# Patient Record
Sex: FEMALE | Race: WHITE | NOT HISPANIC OR LATINO | Employment: UNEMPLOYED | URBAN - METROPOLITAN AREA
[De-identification: names, ages, dates, MRNs, and addresses within clinical notes are randomized per-mention and may not be internally consistent; named-entity substitution may affect disease eponyms.]

---

## 2017-08-24 DIAGNOSIS — Z11.59 NEED FOR HEPATITIS C SCREENING TEST: ICD-10-CM

## 2021-03-09 ENCOUNTER — IMMUNIZATION (OUTPATIENT)
Dept: FAMILY MEDICINE | Facility: CLINIC | Age: 61
End: 2021-03-09

## 2021-03-09 DIAGNOSIS — Z23 NEED FOR VACCINATION: Primary | ICD-10-CM

## 2021-03-09 PROCEDURE — 91300 COVID-19, MRNA, LNP-S, PF, 30 MCG/0.3 ML DOSE VACCINE: CPT | Mod: ,,, | Performed by: INTERNAL MEDICINE

## 2021-03-09 PROCEDURE — 0001A COVID-19, MRNA, LNP-S, PF, 30 MCG/0.3 ML DOSE VACCINE: CPT | Mod: CV19,,, | Performed by: INTERNAL MEDICINE

## 2021-03-09 PROCEDURE — 0001A COVID-19, MRNA, LNP-S, PF, 30 MCG/0.3 ML DOSE VACCINE: ICD-10-PCS | Mod: CV19,,, | Performed by: INTERNAL MEDICINE

## 2021-03-09 PROCEDURE — 91300 COVID-19, MRNA, LNP-S, PF, 30 MCG/0.3 ML DOSE VACCINE: ICD-10-PCS | Mod: ,,, | Performed by: INTERNAL MEDICINE

## 2021-03-30 ENCOUNTER — IMMUNIZATION (OUTPATIENT)
Dept: FAMILY MEDICINE | Facility: CLINIC | Age: 61
End: 2021-03-30

## 2021-03-30 DIAGNOSIS — Z23 NEED FOR VACCINATION: Primary | ICD-10-CM

## 2021-03-30 PROCEDURE — 0002A COVID-19, MRNA, LNP-S, PF, 30 MCG/0.3 ML DOSE VACCINE: ICD-10-PCS | Mod: CV19,,, | Performed by: FAMILY MEDICINE

## 2021-03-30 PROCEDURE — 91300 COVID-19, MRNA, LNP-S, PF, 30 MCG/0.3 ML DOSE VACCINE: ICD-10-PCS | Mod: ,,, | Performed by: FAMILY MEDICINE

## 2021-03-30 PROCEDURE — 0002A COVID-19, MRNA, LNP-S, PF, 30 MCG/0.3 ML DOSE VACCINE: CPT | Mod: CV19,,, | Performed by: FAMILY MEDICINE

## 2021-03-30 PROCEDURE — 91300 COVID-19, MRNA, LNP-S, PF, 30 MCG/0.3 ML DOSE VACCINE: CPT | Mod: ,,, | Performed by: FAMILY MEDICINE

## 2021-11-29 ENCOUNTER — IMMUNIZATION (OUTPATIENT)
Dept: PRIMARY CARE CLINIC | Facility: CLINIC | Age: 61
End: 2021-11-29

## 2021-11-29 DIAGNOSIS — Z23 NEED FOR VACCINATION: Primary | ICD-10-CM

## 2021-11-29 PROCEDURE — 91300 COVID-19, MRNA, LNP-S, PF, 30 MCG/0.3 ML DOSE VACCINE: CPT | Mod: S$GLB,,, | Performed by: FAMILY MEDICINE

## 2021-11-29 PROCEDURE — 91300 COVID-19, MRNA, LNP-S, PF, 30 MCG/0.3 ML DOSE VACCINE: ICD-10-PCS | Mod: S$GLB,,, | Performed by: FAMILY MEDICINE

## 2021-11-29 PROCEDURE — 0004A COVID-19, MRNA, LNP-S, PF, 30 MCG/0.3 ML DOSE VACCINE: ICD-10-PCS | Mod: S$GLB,,, | Performed by: FAMILY MEDICINE

## 2021-11-29 PROCEDURE — 0004A COVID-19, MRNA, LNP-S, PF, 30 MCG/0.3 ML DOSE VACCINE: CPT | Mod: S$GLB,,, | Performed by: FAMILY MEDICINE

## 2023-03-10 ENCOUNTER — TELEPHONE (OUTPATIENT)
Dept: ORTHOPEDICS | Facility: CLINIC | Age: 63
End: 2023-03-10

## 2023-03-10 ENCOUNTER — OFFICE VISIT (OUTPATIENT)
Dept: URGENT CARE | Facility: CLINIC | Age: 63
End: 2023-03-10

## 2023-03-10 VITALS
RESPIRATION RATE: 16 BRPM | HEART RATE: 106 BPM | HEIGHT: 67 IN | BODY MASS INDEX: 29.51 KG/M2 | DIASTOLIC BLOOD PRESSURE: 91 MMHG | SYSTOLIC BLOOD PRESSURE: 171 MMHG | OXYGEN SATURATION: 98 % | TEMPERATURE: 98 F | WEIGHT: 188 LBS
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
## 2023-03-10 DIAGNOSIS — S69.91XA HAND INJURY, RIGHT, INITIAL ENCOUNTER: ICD-10-CM

## 2023-03-10 DIAGNOSIS — S62.111A CLOSED CHIP FRACTURE OF TRIQUETRUM OF RIGHT WRIST, INITIAL ENCOUNTER: ICD-10-CM

## 2023-03-10 PROCEDURE — 73110 XR WRIST COMPLETE 3 VIEWS RIGHT: ICD-10-PCS | Mod: RT,S$GLB,, | Performed by: RADIOLOGY

## 2023-03-10 PROCEDURE — 99204 OFFICE O/P NEW MOD 45 MIN: CPT | Mod: TIER,S$GLB,, | Performed by: NURSE PRACTITIONER

## 2023-03-10 PROCEDURE — 73110 X-RAY EXAM OF WRIST: CPT | Mod: RT,S$GLB,, | Performed by: RADIOLOGY

## 2023-03-10 PROCEDURE — 73130 X-RAY EXAM OF HAND: CPT | Mod: RT,S$GLB,, | Performed by: RADIOLOGY

## 2023-03-10 PROCEDURE — 99204 PR OFFICE/OUTPT VISIT, NEW, LEVL IV, 45-59 MIN: ICD-10-PCS | Mod: TIER,S$GLB,, | Performed by: NURSE PRACTITIONER

## 2023-03-10 PROCEDURE — 73130 XR HAND COMPLETE 3 VIEW RIGHT: ICD-10-PCS | Mod: RT,S$GLB,, | Performed by: RADIOLOGY

## 2023-03-10 RX ORDER — HYDROCODONE BITARTRATE AND ACETAMINOPHEN 5; 325 MG/1; MG/1
1 TABLET ORAL EVERY 6 HOURS PRN
Qty: 12 TABLET | Refills: 0 | Status: SHIPPED | OUTPATIENT
Start: 2023-03-10

## 2023-03-10 NOTE — PROGRESS NOTES
"Subjective:       Patient ID: Jenise Aguila is a 62 y.o. female.    Vitals:  height is 5' 7" (1.702 m) and weight is 85.3 kg (188 lb). Her oral temperature is 97.5 °F (36.4 °C). Her blood pressure is 171/91 (abnormal) and her pulse is 106. Her respiration is 16 and oxygen saturation is 98%.     Chief Complaint: Fall    Fall  Incident onset: yesterday. The fall occurred while walking. She landed on [place]. There was no blood loss. The pain is present in the right hand, right lower arm and right wrist. The pain is at a severity of 8/10. The pain is moderate. The symptoms are aggravated by movement and rotation. Associated symptoms include tingling. Pertinent negatives include no numbness. Associated symptoms comments: Swelling. She has tried elevation, acetaminophen and ice for the symptoms. The treatment provided no relief.     Musculoskeletal:  Positive for trauma, joint pain, joint swelling and abnormal ROM of joint.        Right wrist and hand   Skin:  Negative for abrasion, lesion and puncture wound.   Neurological:  Negative for numbness and tingling.     Objective:      Physical Exam   Constitutional: She is oriented to person, place, and time. She appears well-developed.  Non-toxic appearance. She does not appear ill. No distress.   HENT:   Head: Normocephalic and atraumatic.   Nose: Nose normal.   Mouth/Throat: Oropharynx is clear and moist. Mucous membranes are moist.   Eyes: Conjunctivae and EOM are normal.   Cardiovascular: Normal rate.   Pulmonary/Chest: Effort normal and breath sounds normal. No respiratory distress.   Abdominal: Normal appearance.   Musculoskeletal:         General: Swelling, tenderness and signs of injury present. No deformity.      Comments: See attached photos   Neurological: no focal deficit. She is alert and oriented to person, place, and time.   Skin: Skin is warm, dry and no rash. Capillary refill takes 2 to 3 seconds. No lesion   Psychiatric: Her behavior is normal. Mood " normal.   Nursing note and vitals reviewed.                  Assessment:       1. Fall, initial encounter    2. Hand injury, right, initial encounter    3. Closed chip fracture of triquetrum of right wrist, initial encounter        Right wrist xray: FINDINGS:  A 5 mm bone fragment dorsal to the triquetrum is a probable avulsion fracture.  Soft tissue swelling is noted the to the dorsum of the wrist and hand.  The carpals are otherwise intact without subluxation or dislocation.  The radius ulna and metacarpals appear within normal limits.   Impression:   Avulsion fracture of the dorsum of the triquetrum with soft tissue swelling noted.    Right hand xray: FINDINGS:  There is soft tissue swelling of the dorsum of the hand.  A foreign body is not seen.  A fracture of the bones of the right hand is not seen.  There is however bone fragment along the dorsal surface of the proximal row of carpal suggesting a triquetral fracture.  Subluxation of the carpals is not seen.  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   Impression:   Soft tissue swelling of the dorsum of the hand and small bone density dorsal to the proximal row of carpals suggesting a triquetral fracture.  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     Plan:         Fall, initial encounter    Hand injury, right, initial encounter  -     XR HAND COMPLETE 3 VIEW RIGHT; Future; Expected date: 03/10/2023  -     XR WRIST COMPLETE 3 VIEWS RIGHT; Future; Expected date: 03/10/2023    Closed chip fracture of triquetrum of right wrist, initial encounter  -     HME - OTHER - PLASTER SPLINTS  -     HME - OTHER  -     HYDROcodone-acetaminophen (NORCO) 5-325 mg per tablet; Take 1 tablet by mouth every 6 (six) hours as needed for Pain.  Dispense: 12 tablet; Refill: 0  -     Ambulatory referral/consult to Orthopedics         Splint note: There is no neurovascular compromise after splint/immobilizer application.  The splint/immobilizer is  in good alignment and the patient has good sensation and capillary refill at the time of discharge.

## 2023-03-10 NOTE — PATIENT INSTRUCTIONS
Ibuprofen or Aleve over-the-counter as directed for pain.    Norco for breakthrough pain not relieved by ibuprofen or Aleve  Ice to the affected area for 15-20 minutes every 2-3 hours for the 1st 48 hours then just as needed for pain or continued swelling.    Keep the splint in place at all times, do not allowed to get wet.  Sling to be worn when you are up and around to help keep the arm elevated at all times.    Keep right hand elevated as much as possible.    Follow-up with an orthopedic doctor for further management of wrist fracture and need for change of temporary splint to a permanent cast or brace as deemed necessary by the specialist.

## 2023-03-17 ENCOUNTER — OFFICE VISIT (OUTPATIENT)
Dept: ORTHOPEDICS | Facility: CLINIC | Age: 63
End: 2023-03-17

## 2023-03-17 DIAGNOSIS — S62.112A DISPLACED FRACTURE OF TRIQUETRUM (CUNEIFORM) BONE, LEFT WRIST, INITIAL ENCOUNTER FOR CLOSED FRACTURE: Primary | ICD-10-CM

## 2023-03-17 PROCEDURE — 99212 OFFICE O/P EST SF 10 MIN: CPT | Mod: PBBFAC,PN,25 | Performed by: ORTHOPAEDIC SURGERY

## 2023-03-17 PROCEDURE — 25630 CLTX CARPL FX W/O MNPJ EA B1: CPT | Mod: S$PBB,RT,, | Performed by: ORTHOPAEDIC SURGERY

## 2023-03-17 PROCEDURE — 99204 PR OFFICE/OUTPT VISIT, NEW, LEVL IV, 45-59 MIN: ICD-10-PCS | Mod: S$PBB,57,, | Performed by: ORTHOPAEDIC SURGERY

## 2023-03-17 PROCEDURE — 99999 PR PBB SHADOW E&M-EST. PATIENT-LVL II: ICD-10-PCS | Mod: PBBFAC,,, | Performed by: ORTHOPAEDIC SURGERY

## 2023-03-17 PROCEDURE — 99999 PR PBB SHADOW E&M-EST. PATIENT-LVL II: CPT | Mod: PBBFAC,,, | Performed by: ORTHOPAEDIC SURGERY

## 2023-03-17 PROCEDURE — 25630 CLTX CARPL FX W/O MNPJ EA B1: CPT | Mod: PBBFAC,PN | Performed by: ORTHOPAEDIC SURGERY

## 2023-03-17 PROCEDURE — 25630 PR CLOSED RX CARPAL FX: ICD-10-PCS | Mod: S$PBB,RT,, | Performed by: ORTHOPAEDIC SURGERY

## 2023-03-17 PROCEDURE — 99204 OFFICE O/P NEW MOD 45 MIN: CPT | Mod: S$PBB,57,, | Performed by: ORTHOPAEDIC SURGERY

## 2023-03-17 NOTE — PROGRESS NOTES
Subjective:      Patient ID: Jenise Aguila is a 62 y.o. female.    Chief Complaint: No chief complaint on file.    HPI  62-year-old female with a one-week history of right wrist discomfort.  She sustained accidental blunt trauma during a fall was seen in urgent care placed into a splint and referred for further evaluation.  Denies any other complaints or prior problems with the wrist.  Pain has improved with relative rest and immobilization.  ROS      Objective:    Ortho Exam     Constitutional:   Patient is alert  and oriented in no acute distress  HEENT:  normocephalic atraumatic; PERRL EOMI  Neck:  Supple without adenopathy  Cardiovascular:  Normal rate and rhythm  Pulmonary:  Normal respiratory effort normal chest wall expansion  Abdominal:  Nonprotuberant nondistended  Musculoskeletal:  Patient has resolving ecchymosis diffuse tenderness primarily over the dorsal ulnar aspect of her right wrist  Mild-to-moderate limitation of digit range of motion secondary to diffuse edema.  She has intact skin, sensation, and brisk capillary refill of her digits  No elbow tenderness.  Neurological:  No focal defect; cranial nerves 2-12 grossly intact  Psychiatric/behavioral:  Mood and behavior normal    XR WRIST COMPLETE 3 VIEWS RIGHT  Narrative: EXAMINATION:  XR WRIST COMPLETE 3 VIEWS RIGHT    CLINICAL HISTORY:  Unspecified injury of right wrist, hand and finger(s), initial encounter    TECHNIQUE:  PA, lateral, and oblique views of the right wrist were performed.    COMPARISON:  None    FINDINGS:  A 5 mm bone fragment dorsal to the triquetrum is a probable avulsion fracture.  Soft tissue swelling is noted the to the dorsum of the wrist and hand.  The carpals are otherwise intact without subluxation or dislocation.  The radius ulna and metacarpals appear within normal limits.  Impression: Avulsion fracture of the dorsum of the triquetrum with soft tissue swelling noted.    Electronically signed by: Flash Walker  MD  Date:    03/10/2023  Time:    15:27  XR HAND COMPLETE 3 VIEW RIGHT  Narrative: EXAMINATION:  XR HAND COMPLETE 3 VIEW RIGHT    CLINICAL HISTORY:  Unspecified injury of right wrist, hand and finger(s), initial encounter    TECHNIQUE:  PA, lateral, and oblique views of the right hand were performed.    COMPARISON:  None    FINDINGS:  There is soft tissue swelling of the dorsum of the hand.  A foreign body is not seen.  A fracture of the bones of the right hand is not seen.  There is however bone fragment along the dorsal surface of the proximal row of carpal suggesting a triquetral fracture.  Subluxation of the carpals is not seen.  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  Impression: Soft tissue swelling of the dorsum of the hand and small bone density dorsal to the proximal row of carpals suggesting a triquetral fracture.  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    Electronically signed by: Flash Walker MD  Date:    03/10/2023  Time:    15:25       My Findings:    I have personally reviewed radiographs and concur with above findings    Assessment:       Encounter Diagnosis   Name Primary?    Displaced fracture of triquetrum (cuneiform) bone, left wrist, initial encounter for closed fracture Yes         Plan:       I have discussed medical condition and treatment options with her at length.  We have discussed generalized activity restrictions ice elevation compressive wrapping if needed.  We will get her into a removable brace she may remove for daily cleansing changing clothes etc..  No strenuous activities no heavy lifting no significant range of motion encouraged other than for her digits in her shoulder/elbow.  Follow up radiographs 3-4 weeks sooner if any questions or problems        Past Medical History:   Diagnosis Date    HTN (hypertension)      Past Surgical History:   Procedure Laterality Date    wisdom tooth removal           Current Outpatient  Medications:     HYDROcodone-acetaminophen (NORCO) 5-325 mg per tablet, Take 1 tablet by mouth every 6 (six) hours as needed for Pain., Disp: 12 tablet, Rfl: 0    lisinopril 10 MG tablet, Take 1 tablet (10 mg total) by mouth once daily., Disp: 30 tablet, Rfl: 11    Review of patient's allergies indicates:  No Known Allergies    Family History   Problem Relation Age of Onset    Cancer Mother         lymphoma    Diabetes Mother     Hypertension Mother      Social History     Occupational History    Not on file   Tobacco Use    Smoking status: Some Days     Packs/day: 1.00     Years: 40.00     Pack years: 40.00     Types: Cigarettes    Smokeless tobacco: Never   Substance and Sexual Activity    Alcohol use: Yes     Alcohol/week: 0.0 standard drinks     Comment: 2 -4 rinks per week     Drug use: No    Sexual activity: Yes     Partners: Male

## 2023-04-13 DIAGNOSIS — S62.112A DISPLACED FRACTURE OF TRIQUETRUM (CUNEIFORM) BONE, LEFT WRIST, INITIAL ENCOUNTER FOR CLOSED FRACTURE: Primary | ICD-10-CM

## 2023-05-20 ENCOUNTER — HOSPITAL ENCOUNTER (EMERGENCY)
Facility: HOSPITAL | Age: 63
Discharge: HOME | End: 2023-05-20
Attending: EMERGENCY MEDICINE

## 2023-05-20 VITALS
BODY MASS INDEX: 27.94 KG/M2 | TEMPERATURE: 98 F | SYSTOLIC BLOOD PRESSURE: 160 MMHG | HEART RATE: 84 BPM | HEIGHT: 67 IN | RESPIRATION RATE: 17 BRPM | DIASTOLIC BLOOD PRESSURE: 92 MMHG | OXYGEN SATURATION: 97 % | WEIGHT: 178 LBS

## 2023-05-20 DIAGNOSIS — S93.401A SPRAIN OF RIGHT ANKLE, UNSPECIFIED LIGAMENT, INITIAL ENCOUNTER: Primary | ICD-10-CM

## 2023-05-20 DIAGNOSIS — R52 PAIN: ICD-10-CM

## 2023-05-20 PROCEDURE — 99283 EMERGENCY DEPT VISIT LOW MDM: CPT

## 2023-05-20 PROCEDURE — 25000003 PHARM REV CODE 250: Performed by: EMERGENCY MEDICINE

## 2023-05-20 RX ORDER — IBUPROFEN 400 MG/1
400 TABLET ORAL
Status: COMPLETED | OUTPATIENT
Start: 2023-05-20 | End: 2023-05-20

## 2023-05-20 RX ADMIN — IBUPROFEN 400 MG: 400 TABLET, FILM COATED ORAL at 12:05

## 2023-05-20 NOTE — ED PROVIDER NOTES
Encounter Date: 5/20/2023       History     Chief Complaint   Patient presents with    Ankle Pain     Right ankle pain after ground level trip and fall 1 hour pta     63-year-old female presents emergency department for emergency evaluation of right ankle pain.  Patient states that she woke up to go to the restroom this morning when she accidentally twisted her ankle.  Patient denies any previous injury she is taken no medications for relief of symptoms she reports weight-bearing makes symptoms worse.    Review of patient's allergies indicates:  No Known Allergies  Past Medical History:   Diagnosis Date    HTN (hypertension)      Past Surgical History:   Procedure Laterality Date    wisdom tooth removal       Family History   Problem Relation Age of Onset    Cancer Mother         lymphoma    Diabetes Mother     Hypertension Mother      Social History     Tobacco Use    Smoking status: Some Days     Packs/day: 1.00     Years: 40.00     Pack years: 40.00     Types: Cigarettes    Smokeless tobacco: Never   Substance Use Topics    Alcohol use: Yes     Alcohol/week: 0.0 standard drinks     Comment: 2 -4 rinks per week     Drug use: No     Review of Systems   Constitutional: Negative.    Respiratory: Negative.     Cardiovascular: Negative.    Genitourinary: Negative.    Musculoskeletal:         Right ankle pain   Neurological: Negative.    Hematological: Negative.    Psychiatric/Behavioral: Negative.     All other systems reviewed and are negative.    Physical Exam     Initial Vitals [05/20/23 1057]   BP Pulse Resp Temp SpO2   (!) 156/104 97 18 97.8 °F (36.6 °C) 98 %      MAP       --         Physical Exam    Nursing note and vitals reviewed.  Constitutional: She appears well-developed and well-nourished.   HENT:   Head: Normocephalic.   Musculoskeletal:      Right ankle: Swelling present. Decreased range of motion.     Neurological: She is alert and oriented to person, place, and time. She has normal strength. GCS score  is 15. GCS eye subscore is 4. GCS verbal subscore is 5. GCS motor subscore is 6.   Skin: Skin is warm.       ED Course   Splint Application    Date/Time: 5/20/2023 12:43 PM  Performed by: NALLELY Kee  Authorized by: Luis Felipe Jacinto Jr., MD   Location details: right ankle  Post-procedure: The splinted body part was neurovascularly unchanged following the procedure.  Patient tolerance: Patient tolerated the procedure well with no immediate complications  Comments: Walking boot applied      Labs Reviewed - No data to display       Imaging Results              X-Ray Foot Complete Right (Final result)  Result time 05/20/23 11:34:58      Final result by Jaspreet Harman MD (05/20/23 11:34:58)                   Narrative:    Reason: Pain.    FINDINGS:    3 views of the right foot show no fracture, dislocation, or destructive osseous lesion. Os navicularis noted. There is mild joint space loss and osteophytosis of the first MTP joint. There are degenerative changes of the dorsal midfoot. Achilles and calcaneal enthesopathy noted. No gross soft tissue abnormality.    IMPRESSION:    1.  No acute osseous abnormality.  2.  Os navicularis noted.  3.  Mild osteoarthropathy of the first MTP joint.    Electronically signed by:  Jaspreet Harman DO  5/20/2023 11:34 AM CDT Workstation: [id number]                                     X-Ray Ankle Complete Right (Final result)  Result time 05/20/23 11:34:04      Final result by Jaspreet Harman MD (05/20/23 11:34:04)                   Narrative:    Reason: Pain.    FINDINGS:    3 views of the right ankle show no fracture, dislocation, or destructive osseous lesion.    Ossicles noted adjacent to the lateral malleolus. Ankle joint space is preserved. Achilles and plantar calcaneal enthesopathy noted. Ankle joint space is preserved. There is mild soft tissue swelling about the ankle.    IMPRESSION:    1.  No acute osseous abnormality.  2.  Mild soft tissue swelling about the  ankle.    Electronically signed by:  Jaspreet Harman   5/20/2023 11:34 AM CDT Workstation: [id number]                                     Medications   ibuprofen tablet 400 mg (has no administration in time range)     Medical Decision Making:   Initial Assessment:   63-year-old female presents emergency department for emergency evaluation of right ankle pain.  Patient states that she woke up to go to the restroom this morning when she accidentally twisted her ankle.  Patient denies any previous injury she is taken no medications for relief of symptoms she reports weight-bearing makes symptoms worse.    Differential Diagnosis:   Considerations include fracture, sprain, dislocation  Clinical Tests:   Radiological Study: Ordered and Reviewed  ED Management:  63-year-old female presents emergency department for emergent evaluation of right ankle pain after twisting her ankle this morning at home.  Patient has mild swelling to her ankle, distal pulses are intact no open wounds x-ray imaging of the ankle and foot are unremarkable for any acute fractures.  Patient placed in a walking boot instructed to take Motrin for pain and swelling, given return precautions patient was also instructed follow up with orthopedist for further evaluation, definitive care, repeat imaging and/or further outpatient imaging                        Clinical Impression:   Final diagnoses:  [R52] Pain  [S93.401A] Sprain of right ankle, unspecified ligament, initial encounter (Primary)        ED Disposition Condition    Discharge Stable          ED Prescriptions    None       Follow-up Information       Follow up With Specialties Details Why Contact Info    Jr Thayer II, MD Orthopedic Surgery Schedule an appointment as soon as possible for a visit in 3 days  [address] [place] [address] [address] [place] [id number]  [phone]               Lucy Bradford, [facility]  05/20/23 1245

## 2023-05-20 NOTE — DISCHARGE INSTRUCTIONS
Motrin or Advil for pain and swelling  Please follow-up with orthopedist as directed for further evaluation, definitive care, and/or repeat imaging  Rest ice and elevate extremity

## 2024-09-18 ENCOUNTER — TELEPHONE (OUTPATIENT)
Dept: FAMILY MEDICINE | Facility: CLINIC | Age: 64
End: 2024-09-18
Payer: COMMERCIAL

## 2024-09-18 NOTE — TELEPHONE ENCOUNTER
Spoke with patient.    Instructed [facility] is not in network with her insurance.   She will call the customer service on card to find out who is in network

## 2024-09-18 NOTE — TELEPHONE ENCOUNTER
Call placed to number indicated in the attached message below.  No answer received.  Voicemail unavailable, unable to leave message.

## 2024-09-18 NOTE — TELEPHONE ENCOUNTER
----- Message from Veronica Tomas sent at 9/18/2024 11:34 AM CDT -----  Regarding: Appt  Type:  Sooner Apoointment Request    Caller is requesting a sooner appointment.  Caller declined first available appointment listed below.  Caller will not accept being placed on the waitlist and is requesting a message be sent to doctor.  Name of Caller:Pt    When is the first available appointment?n/a    Symptoms:Meds refill ( BP meds)    Would the patient rather a call back or a response via [facility] [place]? Call back    Best Call Back Number:[phone]    Additional Information: Pt st she is out of her BP meds and is requesting a callback for wendi galvan. Thank you

## 2024-09-18 NOTE — TELEPHONE ENCOUNTER
Veronica Tomas Staff; VERNON MEHTA Staff; VERNON Crouch Staff; VERNON Giraldo Staff     ----- Message from Veronica Tomas sent at 9/18/2024 11:34 AM CDT -----  Regarding: Appt  Type:  Sooner Apoointment Request    Caller is requesting a sooner appointment.  Caller declined first available appointment listed below.  Caller will not accept being placed on the waitlist and is requesting a message be sent to doctor.  Name of Caller:Pt    When is the first available appointment?n/a    Symptoms:Meds refill ( BP meds)    Would the patient rather a call back or a response via [facility]? Call back    Best Call Back Number:[phone]    Additional Information: Pt st she is out of her BP meds and is requesting a callback for wendi ppt. Thank you